# Patient Record
Sex: MALE
[De-identification: names, ages, dates, MRNs, and addresses within clinical notes are randomized per-mention and may not be internally consistent; named-entity substitution may affect disease eponyms.]

---

## 2017-11-14 ENCOUNTER — HOSPITAL ENCOUNTER (EMERGENCY)
Dept: HOSPITAL 5 - ED | Age: 37
LOS: 1 days | Discharge: HOME | End: 2017-11-15
Payer: COMMERCIAL

## 2017-11-14 DIAGNOSIS — R11.2: Primary | ICD-10-CM

## 2017-11-14 DIAGNOSIS — F17.200: ICD-10-CM

## 2017-11-14 PROCEDURE — 81001 URINALYSIS AUTO W/SCOPE: CPT

## 2017-11-14 PROCEDURE — 87040 BLOOD CULTURE FOR BACTERIA: CPT

## 2017-11-14 PROCEDURE — 83690 ASSAY OF LIPASE: CPT

## 2017-11-14 PROCEDURE — 80074 ACUTE HEPATITIS PANEL: CPT

## 2017-11-14 PROCEDURE — 82140 ASSAY OF AMMONIA: CPT

## 2017-11-14 PROCEDURE — 99284 EMERGENCY DEPT VISIT MOD MDM: CPT

## 2017-11-14 PROCEDURE — 82150 ASSAY OF AMYLASE: CPT

## 2017-11-14 PROCEDURE — 96374 THER/PROPH/DIAG INJ IV PUSH: CPT

## 2017-11-14 PROCEDURE — 80048 BASIC METABOLIC PNL TOTAL CA: CPT

## 2017-11-14 PROCEDURE — 74177 CT ABD & PELVIS W/CONTRAST: CPT

## 2017-11-14 PROCEDURE — 96375 TX/PRO/DX INJ NEW DRUG ADDON: CPT

## 2017-11-14 PROCEDURE — 85025 COMPLETE CBC W/AUTO DIFF WBC: CPT

## 2017-11-14 PROCEDURE — 36415 COLL VENOUS BLD VENIPUNCTURE: CPT

## 2017-11-14 PROCEDURE — 96361 HYDRATE IV INFUSION ADD-ON: CPT

## 2017-11-15 VITALS — SYSTOLIC BLOOD PRESSURE: 112 MMHG | DIASTOLIC BLOOD PRESSURE: 77 MMHG

## 2017-11-15 LAB
ALBUMIN SERPL-MCNC: 4.7 G/DL (ref 3.9–5)
ALBUMIN/GLOB SERPL: 1.3 %
ALP SERPL-CCNC: 108 UNITS/L (ref 35–129)
ALT SERPL-CCNC: 71 UNITS/L (ref 7–56)
AMYLASE SERPL-CCNC: 59 UNITS/L (ref 27–131)
ANION GAP SERPL CALC-SCNC: 24 MMOL/L
BASOPHILS NFR BLD AUTO: 0.4 % (ref 0–1.8)
BILIRUB DIRECT SERPL-MCNC: < 0.2 MG/DL (ref 0–0.2)
BILIRUB INDIRECT SERPL-MCNC: 0.3 MG/DL
BILIRUB SERPL-MCNC: 0.5 MG/DL (ref 0.1–1.2)
BILIRUB UR QL STRIP: (no result)
BLOOD UR QL VISUAL: (no result)
BUN SERPL-MCNC: 19 MG/DL (ref 9–20)
BUN/CREAT SERPL: 24 %
CALCIUM SERPL-MCNC: 10.1 MG/DL (ref 8.4–10.2)
CHLORIDE SERPL-SCNC: 100.9 MMOL/L (ref 98–107)
CO2 SERPL-SCNC: 21 MMOL/L (ref 22–30)
EOSINOPHIL NFR BLD AUTO: 0 % (ref 0–4.3)
GLUCOSE SERPL-MCNC: 157 MG/DL (ref 75–100)
HCT VFR BLD CALC: 45.6 % (ref 35.5–45.6)
HGB BLD-MCNC: 16 GM/DL (ref 11.8–15.2)
KETONES UR STRIP-MCNC: (no result) MG/DL
LEUKOCYTE ESTERASE UR QL STRIP: (no result)
LIPASE SERPL-CCNC: 30 UNITS/L (ref 13–60)
MCH RBC QN AUTO: 31 PG (ref 28–32)
MCHC RBC AUTO-ENTMCNC: 35 % (ref 32–34)
MCV RBC AUTO: 88 FL (ref 84–94)
MUCOUS THREADS #/AREA URNS HPF: (no result) /HPF
NITRITE UR QL STRIP: (no result)
PH UR STRIP: 6 [PH] (ref 5–7)
PLATELET # BLD: 272 K/MM3 (ref 140–440)
POTASSIUM SERPL-SCNC: 4.2 MMOL/L (ref 3.6–5)
PROT SERPL-MCNC: 8.2 G/DL (ref 6.3–8.2)
PROT UR STRIP-MCNC: (no result) MG/DL
RBC # BLD AUTO: 5.16 M/MM3 (ref 3.65–5.03)
RBC #/AREA URNS HPF: 1 /HPF (ref 0–6)
SODIUM SERPL-SCNC: 142 MMOL/L (ref 137–145)
UROBILINOGEN UR-MCNC: < 2 MG/DL (ref ?–2)
WBC # BLD AUTO: 12 K/MM3 (ref 4.5–11)
WBC #/AREA URNS HPF: 1 /HPF (ref 0–6)

## 2017-11-15 NOTE — EMERGENCY DEPARTMENT REPORT
ED N/V/D HPI





- General


Chief complaint: Nausea/Vomiting/Diarrhea


Stated complaint: VOMITING


Time Seen by Provider: 11/15/17 01:56


Source: patient


Mode of arrival: Ambulatory


Limitations: No Limitations





- History of Present Illness


Initial comments: 


37-year-old male past medical history smoker, gerd presents with complaint of 2 

days of intermittent nausea.  Patient's is awake alert and oriented 3 somewhat 

nauseous on clinical exam.  States he has been able to tolerate fluids but has 

some difficulty tolerating solids.  Denies fever or chills denies recent 

travel.  Denies eating anything out of the ordinary.  States that he had 

abdominal pain earlier today was has since resolved but is still experiencing 

nausea.  Denies any rash denies any dysuria.


MD complaint: nausea, vomiting


Onset/Timin


-: days(s)


Description of Vomiting: watery


Associated Abdominal Pain: Yes


Location: periumbillcal


Severity: mild


Quality: aching


Consistency: now resolved


Worsens with: eating


Associated Symptoms: denies other symptoms





- Related Data


 Previous Rx's











 Medication  Instructions  Recorded  Last Taken  Type


 


Bismuth Subsalicylate 15 ml PO QID PRN #1 bottle 11/15/17 Unknown Rx





[Pepto-Bismol]    


 


Famotidine [Pepcid] 20 mg PO BID PRN #30 tablet 11/15/17 Unknown Rx


 


Ondansetron [Zofran Odt] 4 mg PO Q8H PRN #12 tab.rapdis 11/15/17 Unknown Rx











 Allergies











Allergy/AdvReac Type Severity Reaction Status Date / Time


 


No Known Allergies Allergy   Verified 17 23:22














ED Review of Systems


ROS: 


Stated complaint: VOMITING


Other details as noted in HPI





Constitutional: denies: chills, fever


Eyes: denies: eye pain, eye discharge, vision change


ENT: denies: ear pain, throat pain


Respiratory: denies: cough, shortness of breath, wheezing


Cardiovascular: denies: chest pain, palpitations


Endocrine: no symptoms reported


Gastrointestinal: abdominal pain, nausea.  denies: diarrhea


Genitourinary: denies: urgency, dysuria


Musculoskeletal: denies: back pain, joint swelling, arthralgia


Skin: denies: rash, lesions


Neurological: denies: headache, weakness, paresthesias


Psychiatric: denies: anxiety, depression


Hematological/Lymphatic: denies: easy bleeding, easy bruising





ED Past Medical Hx





- Past Medical History


Previous Medical History?: No





- Surgical History


Past Surgical History?: No





- Social History


Smoking Status: Current Every Day Smoker


Substance Use Type: None





- Medications


Home Medications: 


 Home Medications











 Medication  Instructions  Recorded  Confirmed  Last Taken  Type


 


Bismuth Subsalicylate 15 ml PO QID PRN #1 bottle 11/15/17  Unknown Rx





[Pepto-Bismol]     


 


Famotidine [Pepcid] 20 mg PO BID PRN #30 tablet 11/15/17  Unknown Rx


 


Ondansetron [Zofran Odt] 4 mg PO Q8H PRN #12 tab.rapdis 11/15/17  Unknown Rx














ED Physical Exam





- General


Limitations: No Limitations


General appearance: alert, in no apparent distress





- Head


Head exam: Present: atraumatic, normocephalic





- Eye


Eye exam: Present: normal appearance, PERRL, EOMI





- ENT


ENT exam: Present: mucous membranes moist





- Neck


Neck exam: Present: normal inspection





- Respiratory


Respiratory exam: Present: normal lung sounds bilaterally.  Absent: respiratory 

distress





- Cardiovascular


Cardiovascular Exam: Present: regular rate, normal rhythm.  Absent: systolic 

murmur, diastolic murmur, rubs, gallop





- GI/Abdominal


GI/Abdominal exam: Present: soft (abdomen soft to palpation on clinical exam), 

normal bowel sounds





- Rectal


Rectal exam: Present: deferred





- Extremities Exam


Extremities exam: Present: normal inspection





- Back Exam


Back exam: Present: normal inspection





- Neurological Exam


Neurological exam: Present: alert, oriented X3, CN II-XII intact, normal gait





- Psychiatric


Psychiatric exam: Present: normal affect, normal mood





- Skin


Skin exam: Present: warm, dry, intact, normal color.  Absent: rash





ED Course


 Vital Signs











  11/14/17 11/15/17





  23:21 04:23


 


Temperature 98.4 F 98.7 F


 


Pulse Rate 68 77


 


Respiratory 18 16





Rate  


 


Blood Pressure 129/82 


 


Blood Pressure  114/77





[Left]  


 


O2 Sat by Pulse 98 100





Oximetry  














ED Medical Decision Making





- Lab Data


Result diagrams: 


 17 23:39





 17 23:39





- Medical Decision Making


A/P: Nausea and vomiting


1-CT scan unremarkable, results discussed with Dr. Thomas before discharge


2-lactic acid is significantly improved with rehydration. Lipase WNL


3-urinalysis unremarkable, patient is now tolerating by mouth fluids without 

difficulty and is no longer nauseous, abdominal pain that spontaneously resolved


4-patient discharged, vital signs normal.  Advised patient to return to the ED 

if he experiences fevers chills, persistent nausea and vomiting or inability to 

tolerate by mouth fluids or food.  Patient stated he understood my 

instructions. 


Critical care attestation.: 


If time is entered above; I have spent that time in minutes in the direct care 

of this critically ill patient, excluding procedure time.








ED Disposition


Clinical Impression: 


Nausea & vomiting


Qualifiers:


 Vomiting type: unspecified Vomiting Intractability: non-intractable Qualified 

Code(s): R11.2 - Nausea with vomiting, unspecified





Disposition:  TO HOME OR SELFCARE


Is pt being admited?: No


Does the pt Need Aspirin: No


Condition: Stable


Instructions:  Abdominal Pain (ED), Acute Nausea and Vomiting (ED)


Prescriptions: 


Bismuth Subsalicylate [Pepto-Bismol] 15 ml PO QID PRN #1 bottle


 PRN Reason: Indigestion


Famotidine [Pepcid] 20 mg PO BID PRN #30 tablet


 PRN Reason: Indigestion


Ondansetron [Zofran Odt] 4 mg PO Q8H PRN #12 tab.rapdis


 PRN Reason: Nausea


Referrals: 


[facility] [Provider Group] - 3-5 Days


[facility] [Outside] - 3-5 Days


Forms:  Work/School Release Form(ED)


Time of Disposition: 07:04

## 2017-11-15 NOTE — CAT SCAN REPORT
FINAL REPORT



EXAM:  CT ABDOMEN PELVIS W CON



HISTORY:  abdominal pain worsening ? pancreatitis 



COMPARISON:  None available. 



TECHNIQUE:  Contiguous axial images were obtained. Additional

sagittal and coronal reformatted images were obtained.

Administration of IV contrast given per institution protocol.

Images submitted for interpretation. 100 cc Omnipaque [id number]. 



FINDINGS:  

Mild subsegmental atelectasis at the lung bases. No calcified

gallstones or biliary dilatation. Mild diffuse fatty infiltration

of the liver. Spleen and pancreas are unremarkable. There is

homogeneous enhancement the pancreas. No peripancreatic stranding

or fluid. No adrenal mass. 



No solid renal lesion or hydronephrosis. There is subcentimeter

low-attenuation renal lesions bilaterally. These are too small to

accurately characterize by CT, but may reflects cysts. Aorta and

IVC are normal in caliber. 



Urinary bladder and prostate gland are grossly unremarkable. No

free fluid or lymphadenopathy. Large and small bowel loops are

normal in caliber. Mild diverticulosis of left colon. No

diverticulitis. The appendix is partially gas-filled and normal

in caliber. No inflammatory changes of the appendix. Portion of

the sigmoid colon is decompressed. This limits evaluation wall

thickening. No adjacent fat stranding or fluid to suggest active

inflammation of the decompressed bowel. 



Bony pelvis and lumbar spine are grossly intact. Mild

degenerative changes of the lumbar spine. There is narrowing of

the celiac origin which appears to relate to prominent median

arcuate ligament. Major branches of the aorta remain patent. 



IMPRESSION:  

No focal inflammatory changes of the abdomen and pelvis.

Specifically, no evidence of acute appendicitis by CT.

## 2021-01-14 ENCOUNTER — OFFICE VISIT (OUTPATIENT)
Dept: URBAN - METROPOLITAN AREA CLINIC 19 | Facility: CLINIC | Age: 41
End: 2021-01-14
Payer: COMMERCIAL

## 2021-01-14 ENCOUNTER — WEB ENCOUNTER (OUTPATIENT)
Dept: URBAN - METROPOLITAN AREA CLINIC 19 | Facility: CLINIC | Age: 41
End: 2021-01-14

## 2021-01-14 ENCOUNTER — DASHBOARD ENCOUNTERS (OUTPATIENT)
Age: 41
End: 2021-01-14

## 2021-01-14 DIAGNOSIS — R12 HEARTBURN: ICD-10-CM

## 2021-01-14 DIAGNOSIS — R10.13 EPIGASTRIC ABDOMINAL PAIN: ICD-10-CM

## 2021-01-14 PROCEDURE — 99244 OFF/OP CNSLTJ NEW/EST MOD 40: CPT | Performed by: INTERNAL MEDICINE

## 2021-01-14 PROCEDURE — 99204 OFFICE O/P NEW MOD 45 MIN: CPT | Performed by: INTERNAL MEDICINE

## 2021-01-14 PROCEDURE — G8484 FLU IMMUNIZE NO ADMIN: HCPCS | Performed by: INTERNAL MEDICINE

## 2021-01-14 RX ORDER — PANTOPRAZOLE SODIUM 40 MG/1
1 TABLET TABLET, DELAYED RELEASE ORAL ONCE A DAY
Status: ACTIVE | COMMUNITY

## 2021-01-14 RX ORDER — SERTRALINE 50 MG/1
1 TABLET TABLET, FILM COATED ORAL ONCE A DAY
Status: ACTIVE | COMMUNITY

## 2021-01-14 RX ORDER — BUPRENORPHINE HCL 2 MG/1
1 TABLET UNDER THE TONGUE AND ALLOW TO DISSOLVE TABLET SUBLINGUAL ONCE A DAY
Status: ACTIVE | COMMUNITY

## 2021-01-14 RX ORDER — MECLIZINE HCL 25MG 25 MG/1
1 TABLET AS NEEDED TABLET, CHEWABLE ORAL ONCE A DAY
Status: ACTIVE | COMMUNITY

## 2021-01-14 NOTE — HPI-TODAY'S VISIT:
Mr. Blake is a 40 year old male who was referred by Dr. Kym Morfin for abdominal pain and GERD. A copy of this note will be sent to her referring provider.   He reports for 10 years having epigastric abdominal pain. He reports despite taking protonix 40mg daily he can have such severe pain he is rolling in bed trying to get ulceration.   He reports his wife was tested for H. pylori. He reports he has never been tested or treated for H. pylori.   He reports he has never had an EGD before.

## 2021-01-17 LAB
H PYLORI, IGM ABS: <9
H. PYLORI, IGA ABS: <9
H. PYLORI, IGG ABS: 0.63

## 2021-02-08 ENCOUNTER — CLAIMS CREATED FROM THE CLAIM WINDOW (OUTPATIENT)
Dept: URBAN - METROPOLITAN AREA CLINIC 4 | Facility: CLINIC | Age: 41
End: 2021-02-08
Payer: COMMERCIAL

## 2021-02-08 ENCOUNTER — OFFICE VISIT (OUTPATIENT)
Dept: URBAN - METROPOLITAN AREA SURGERY CENTER 31 | Facility: SURGERY CENTER | Age: 41
End: 2021-02-08
Payer: COMMERCIAL

## 2021-02-08 DIAGNOSIS — R10.13 ABDOMINAL DISCOMFORT, EPIGASTRIC: ICD-10-CM

## 2021-02-08 DIAGNOSIS — K29.60 OTHER GASTRITIS WITHOUT BLEEDING: ICD-10-CM

## 2021-02-08 DIAGNOSIS — K31.89 OTHER DISEASES OF STOMACH AND DUODENUM: ICD-10-CM

## 2021-02-08 DIAGNOSIS — K31.89 ACQUIRED DEFORMITY OF DUODENUM: ICD-10-CM

## 2021-02-08 PROCEDURE — G8907 PT DOC NO EVENTS ON DISCHARG: HCPCS | Performed by: INTERNAL MEDICINE

## 2021-02-08 PROCEDURE — 88305 TISSUE EXAM BY PATHOLOGIST: CPT | Performed by: PATHOLOGY

## 2021-02-08 PROCEDURE — 43239 EGD BIOPSY SINGLE/MULTIPLE: CPT | Performed by: INTERNAL MEDICINE

## 2021-02-08 PROCEDURE — 88312 SPECIAL STAINS GROUP 1: CPT | Performed by: PATHOLOGY

## 2021-02-08 RX ORDER — MECLIZINE HCL 25MG 25 MG/1
1 TABLET AS NEEDED TABLET, CHEWABLE ORAL ONCE A DAY
Status: ACTIVE | COMMUNITY

## 2021-02-08 RX ORDER — PANTOPRAZOLE SODIUM 40 MG/1
1 TABLET TABLET, DELAYED RELEASE ORAL ONCE A DAY
Status: ACTIVE | COMMUNITY

## 2021-02-08 RX ORDER — BUPRENORPHINE HCL 2 MG/1
1 TABLET UNDER THE TONGUE AND ALLOW TO DISSOLVE TABLET SUBLINGUAL ONCE A DAY
Status: ACTIVE | COMMUNITY

## 2021-02-08 RX ORDER — SERTRALINE 50 MG/1
1 TABLET TABLET, FILM COATED ORAL ONCE A DAY
Status: ACTIVE | COMMUNITY